# Patient Record
Sex: FEMALE | Race: WHITE | NOT HISPANIC OR LATINO | Employment: FULL TIME | URBAN - METROPOLITAN AREA
[De-identification: names, ages, dates, MRNs, and addresses within clinical notes are randomized per-mention and may not be internally consistent; named-entity substitution may affect disease eponyms.]

---

## 2020-02-11 ENCOUNTER — TELEPHONE (OUTPATIENT)
Dept: OCCUPATIONAL MEDICINE | Facility: CLINIC | Age: 60
End: 2020-02-11

## 2020-02-12 ENCOUNTER — OFFICE VISIT (OUTPATIENT)
Dept: OCCUPATIONAL MEDICINE | Facility: CLINIC | Age: 60
End: 2020-02-12

## 2020-02-12 VITALS
HEART RATE: 78 BPM | SYSTOLIC BLOOD PRESSURE: 126 MMHG | RESPIRATION RATE: 14 BRPM | BODY MASS INDEX: 28.35 KG/M2 | HEIGHT: 63 IN | OXYGEN SATURATION: 95 % | WEIGHT: 160 LBS | DIASTOLIC BLOOD PRESSURE: 88 MMHG | TEMPERATURE: 98 F

## 2020-02-12 DIAGNOSIS — Z02.1 PRE-EMPLOYMENT HEALTH SCREENING EXAMINATION: ICD-10-CM

## 2020-02-12 PROCEDURE — 8915 PR COMPREHENSIVE PHYSICAL: Performed by: NURSE PRACTITIONER

## 2020-02-12 RX ORDER — ASPIRIN 81 MG/1
81 TABLET, CHEWABLE ORAL DAILY
COMMUNITY

## 2020-02-12 SDOH — HEALTH STABILITY: MENTAL HEALTH: HOW OFTEN DO YOU HAVE A DRINK CONTAINING ALCOHOL?: NEVER

## 2020-12-07 ENCOUNTER — OFFICE VISIT (OUTPATIENT)
Dept: URGENT CARE | Facility: PHYSICIAN GROUP | Age: 60
End: 2020-12-07
Payer: COMMERCIAL

## 2020-12-07 ENCOUNTER — HOSPITAL ENCOUNTER (OUTPATIENT)
Facility: MEDICAL CENTER | Age: 60
End: 2020-12-07
Attending: PHYSICIAN ASSISTANT
Payer: COMMERCIAL

## 2020-12-07 VITALS
OXYGEN SATURATION: 99 % | TEMPERATURE: 98 F | WEIGHT: 166 LBS | RESPIRATION RATE: 16 BRPM | SYSTOLIC BLOOD PRESSURE: 132 MMHG | HEART RATE: 82 BPM | DIASTOLIC BLOOD PRESSURE: 92 MMHG | BODY MASS INDEX: 29.41 KG/M2 | HEIGHT: 63 IN

## 2020-12-07 DIAGNOSIS — N30.01 ACUTE CYSTITIS WITH HEMATURIA: ICD-10-CM

## 2020-12-07 DIAGNOSIS — R30.0 DYSURIA: ICD-10-CM

## 2020-12-07 LAB
APPEARANCE UR: NORMAL
BILIRUB UR STRIP-MCNC: NORMAL MG/DL
COLOR UR AUTO: YELLOW
GLUCOSE UR STRIP.AUTO-MCNC: NORMAL MG/DL
KETONES UR STRIP.AUTO-MCNC: NORMAL MG/DL
LEUKOCYTE ESTERASE UR QL STRIP.AUTO: NORMAL
NITRITE UR QL STRIP.AUTO: NORMAL
PH UR STRIP.AUTO: 5 [PH] (ref 5–8)
PROT UR QL STRIP: NORMAL MG/DL
RBC UR QL AUTO: NORMAL
SP GR UR STRIP.AUTO: 1.02
UROBILINOGEN UR STRIP-MCNC: 0.2 MG/DL

## 2020-12-07 PROCEDURE — 87086 URINE CULTURE/COLONY COUNT: CPT

## 2020-12-07 PROCEDURE — 81002 URINALYSIS NONAUTO W/O SCOPE: CPT | Performed by: PHYSICIAN ASSISTANT

## 2020-12-07 PROCEDURE — 99204 OFFICE O/P NEW MOD 45 MIN: CPT | Performed by: PHYSICIAN ASSISTANT

## 2020-12-07 RX ORDER — OMEPRAZOLE 20 MG/1
20 CAPSULE, DELAYED RELEASE ORAL DAILY
COMMUNITY

## 2020-12-07 RX ORDER — NITROFURANTOIN 25; 75 MG/1; MG/1
100 CAPSULE ORAL EVERY 12 HOURS
Qty: 10 CAP | Refills: 0 | Status: SHIPPED | OUTPATIENT
Start: 2020-12-07 | End: 2020-12-12

## 2020-12-07 ASSESSMENT — ENCOUNTER SYMPTOMS
FEVER: 0
FLANK PAIN: 0
CHILLS: 0
ABDOMINAL PAIN: 1

## 2020-12-10 LAB
BACTERIA UR CULT: NORMAL
SIGNIFICANT IND 70042: NORMAL
SITE SITE: NORMAL
SOURCE SOURCE: NORMAL

## 2021-03-15 DIAGNOSIS — Z23 NEED FOR VACCINATION: ICD-10-CM

## 2021-12-08 ENCOUNTER — NON-PROVIDER VISIT (OUTPATIENT)
Dept: OCCUPATIONAL MEDICINE | Facility: CLINIC | Age: 61
End: 2021-12-08

## 2021-12-08 DIAGNOSIS — Z11.1 ENCOUNTER FOR PPD TEST: ICD-10-CM

## 2021-12-08 PROCEDURE — 86580 TB INTRADERMAL TEST: CPT | Performed by: NURSE PRACTITIONER

## 2021-12-10 ENCOUNTER — NON-PROVIDER VISIT (OUTPATIENT)
Dept: OCCUPATIONAL MEDICINE | Facility: CLINIC | Age: 61
End: 2021-12-10

## 2021-12-10 LAB — TB WHEAL 3D P 5 TU DIAM: NORMAL MM

## 2023-05-16 ENCOUNTER — OFFICE VISIT (OUTPATIENT)
Dept: URGENT CARE | Facility: PHYSICIAN GROUP | Age: 63
End: 2023-05-16
Payer: COMMERCIAL

## 2023-05-16 VITALS
WEIGHT: 175 LBS | BODY MASS INDEX: 31.01 KG/M2 | RESPIRATION RATE: 18 BRPM | HEIGHT: 63 IN | SYSTOLIC BLOOD PRESSURE: 144 MMHG | OXYGEN SATURATION: 98 % | HEART RATE: 89 BPM | DIASTOLIC BLOOD PRESSURE: 88 MMHG | TEMPERATURE: 97.8 F

## 2023-05-16 DIAGNOSIS — M72.2 PLANTAR FASCIITIS OF LEFT FOOT: ICD-10-CM

## 2023-05-16 PROCEDURE — 3077F SYST BP >= 140 MM HG: CPT | Performed by: PHYSICIAN ASSISTANT

## 2023-05-16 PROCEDURE — 3079F DIAST BP 80-89 MM HG: CPT | Performed by: PHYSICIAN ASSISTANT

## 2023-05-16 PROCEDURE — 99213 OFFICE O/P EST LOW 20 MIN: CPT | Performed by: PHYSICIAN ASSISTANT

## 2023-05-16 RX ORDER — NAPROXEN 500 MG/1
500 TABLET ORAL 2 TIMES DAILY WITH MEALS
Qty: 30 TABLET | Refills: 0 | Status: SHIPPED | OUTPATIENT
Start: 2023-05-16

## 2023-05-16 RX ORDER — METHYLPREDNISOLONE 4 MG/1
4 TABLET ORAL DAILY
Qty: 21 TABLET | Refills: 0 | Status: SHIPPED | OUTPATIENT
Start: 2023-05-16

## 2023-05-16 ASSESSMENT — ENCOUNTER SYMPTOMS
NEUROLOGICAL NEGATIVE: 1
RESPIRATORY NEGATIVE: 1
CARDIOVASCULAR NEGATIVE: 1
CONSTITUTIONAL NEGATIVE: 1

## 2023-05-16 NOTE — LETTER
[facility] [place]  [address] [address]     May 16, 2023    Patient: Morena Cespedes   YOB: 1960   Date of Visit: 5/16/2023       To Whom It May Concern:    Morena Cespedes was seen and treated in our department on 5/16/2023.  Examination today does reveal plantar fasciitis of the left foot, condition is not limiting her ability to perform work, no work restrictions are needed at this    Sincerely,     Harshad Dean P.A.-C.

## 2023-05-16 NOTE — PROGRESS NOTES
"  Subjective:     Morena Cespedes  is a 62 y.o. female who presents for Foot Problem (Left foot pain x 3 weeks)       Presents today with complaint of pain over the left foot that is been ongoing for the last 3 weeks.  Symptoms are initially worsened in the morning when she first getting out of bed but do improve throughout the day.  Long periods of standing and walking at work to also worsen her symptoms.  No numbness tingling foot or toes.  No trauma or injury associated with symptom onset.         Review of Systems   Constitutional: Negative.    Respiratory: Negative.     Cardiovascular: Negative.    Musculoskeletal:         Left foot pain   Neurological: Negative.       No Known Allergies  History reviewed. No pertinent past medical history.     Objective:   BP (!) 144/88   Pulse 89   Temp 36.6 °C (97.8 °F)   Resp 18   Ht 1.6 m (5' 3\")   Wt 79.4 kg (175 lb)   SpO2 98%   BMI 31.00 kg/m²   Physical Exam  Vitals and nursing note reviewed.   Constitutional:       General: She is not in acute distress.     Appearance: She is not ill-appearing or toxic-appearing.   HENT:      Head: Normocephalic.      Nose: No rhinorrhea.   Eyes:      General: No scleral icterus.     Conjunctiva/sclera: Conjunctivae normal.   Pulmonary:      Effort: Pulmonary effort is normal. No respiratory distress.      Breath sounds: No stridor.   Musculoskeletal:      Cervical back: Neck supple.      Comments: Examination of the left foot does reveal reproducible pain along the plantar fascial surface.  Ankle and toe range of motion remain intact.   Neurological:      Mental Status: She is alert and oriented to person, place, and time.   Psychiatric:         Mood and Affect: Mood normal.         Behavior: Behavior normal.         Thought Content: Thought content normal.         Judgment: Judgment normal.             Diagnostic testing: None    Assessment/Plan:     Encounter Diagnoses   Name Primary?    Plantar fasciitis of left foot  "          Plan for care for today's complaint includes Medrol Dosepak and naproxen for left foot plantar fasciitis.  Discussed at home myofascial massage techniques and Stretching exercises for additional symptoms support.  Continue to monitor symptoms and return to urgent care or follow-up with primary care provider if symptoms remain ongoing.  Follow-up in the emergency department if symptoms become severe, ER precautions discussed in office today..  Prescription for Medrol Dosepak, naproxen provided.    See AVS Instructions below for written guidance provided to patient on after-visit management and care in addition to our verbal discussion during the visit.    Please note that this dictation was created using voice recognition software. I have attempted to correct all errors, but there may be sound-alike, spelling, grammar and possibly content errors that I did not discover before finalizing the note.    Harshad Dean PA-C

## 2023-08-18 NOTE — PROGRESS NOTES
"  Subjective:   Morena Cespedes is a 60 y.o. female who presents today with   Chief Complaint   Patient presents with   • Dysuria     pain pressure x2days        Dysuria   This is a new problem. The current episode started today. The problem occurs every urination. The problem has been unchanged. The quality of the pain is described as burning. The pain is mild. There has been no fever. There is no history of pyelonephritis. Associated symptoms include frequency and urgency. Pertinent negatives include no chills or flank pain. She has tried increased fluids for the symptoms. The treatment provided mild relief. Her past medical history is significant for kidney stones.       PMH:  has no past medical history on file.  MEDS:   Current Outpatient Medications:   •  omeprazole (PRILOSEC) 20 MG delayed-release capsule, Take 20 mg by mouth every day., Disp: , Rfl:   •  nitrofurantoin (MACROBID) 100 MG Cap, Take 1 Cap by mouth every 12 hours for 5 days., Disp: 10 Cap, Rfl: 0  •  aspirin (ASA) 81 MG Chew Tab chewable tablet, Take 81 mg by mouth every day., Disp: , Rfl:   ALLERGIES: No Known Allergies  SURGHX: History reviewed. No pertinent surgical history.  SOCHX:  reports that she has never smoked. She has never used smokeless tobacco. She reports that she does not drink alcohol or use drugs.  FH: Reviewed with patient, not pertinent to this visit.       Review of Systems   Constitutional: Negative for chills and fever.   Gastrointestinal: Positive for abdominal pain.   Genitourinary: Positive for dysuria, frequency and urgency. Negative for flank pain.   All other systems reviewed and are negative.       Objective:   /92   Pulse 82   Temp 36.7 °C (98 °F) (Temporal)   Resp 16   Ht 1.6 m (5' 3\")   Wt 75.3 kg (166 lb)   SpO2 99%   BMI 29.41 kg/m²   Physical Exam  Vitals signs and nursing note reviewed.   Constitutional:       General: She is not in acute distress.     Appearance: Normal appearance. She is " Problem: Pain  Goal: Verbalizes/displays adequate comfort level or baseline comfort level  Outcome: Progressing well-developed. She is not ill-appearing or toxic-appearing.   HENT:      Head: Normocephalic and atraumatic.      Right Ear: Hearing normal.      Left Ear: Hearing normal.   Eyes:      Conjunctiva/sclera: Conjunctivae normal.   Cardiovascular:      Rate and Rhythm: Normal rate and regular rhythm.      Heart sounds: Normal heart sounds.   Pulmonary:      Effort: Pulmonary effort is normal.      Breath sounds: Normal breath sounds.   Abdominal:      Tenderness: There is abdominal tenderness in the suprapubic area. There is no right CVA tenderness or left CVA tenderness.   Genitourinary:     Comments: Patient deferred  exam  Musculoskeletal:      Comments: Normal movement in all 4 extremities   Skin:     General: Skin is warm and dry.   Neurological:      Mental Status: She is alert.      Coordination: Coordination normal.   Psychiatric:         Mood and Affect: Mood normal.         UA consistent with UTI  Assessment/Plan:   Assessment    1. Dysuria  - POCT Urinalysis  - nitrofurantoin (MACROBID) 100 MG Cap; Take 1 Cap by mouth every 12 hours for 5 days.  Dispense: 10 Cap; Refill: 0    2. Acute cystitis with hematuria  - Urine Culture; Future    Other orders  - omeprazole (PRILOSEC) 20 MG delayed-release capsule; Take 20 mg by mouth every day.  Offered CT or ultrasound to rule out any renal stones, or other Gyn etiology but she declines and will return if symptoms do not go away.  Differential diagnosis, natural history, supportive care, and indications for immediate follow-up discussed.   Patient given instructions and understanding of medications and treatment.    If not improving in 3-5 days, F/U with PCP or return to  if symptoms worsen.  Strict ER precautions given.  Patient agreeable to plan.      Please note that this dictation was created using voice recognition software. I have made every reasonable attempt to correct obvious errors, but I expect that there are errors of grammar and possibly content that  I did not discover before finalizing the note.    Ronny Le PA-C

## 2024-08-11 ENCOUNTER — OFFICE VISIT (OUTPATIENT)
Dept: URGENT CARE | Facility: PHYSICIAN GROUP | Age: 64
End: 2024-08-11
Payer: COMMERCIAL

## 2024-08-11 VITALS
SYSTOLIC BLOOD PRESSURE: 144 MMHG | BODY MASS INDEX: 30.12 KG/M2 | HEART RATE: 63 BPM | OXYGEN SATURATION: 97 % | WEIGHT: 170 LBS | RESPIRATION RATE: 18 BRPM | TEMPERATURE: 97.4 F | HEIGHT: 63 IN | DIASTOLIC BLOOD PRESSURE: 100 MMHG

## 2024-08-11 DIAGNOSIS — R10.32 LEFT LOWER QUADRANT ABDOMINAL PAIN: ICD-10-CM

## 2024-08-11 DIAGNOSIS — R03.0 ELEVATED BLOOD PRESSURE READING: ICD-10-CM

## 2024-08-11 LAB
APPEARANCE UR: CLEAR
BILIRUB UR STRIP-MCNC: NORMAL MG/DL
COLOR UR AUTO: YELLOW
GLUCOSE UR STRIP.AUTO-MCNC: NORMAL MG/DL
KETONES UR STRIP.AUTO-MCNC: NORMAL MG/DL
LEUKOCYTE ESTERASE UR QL STRIP.AUTO: NORMAL
NITRITE UR QL STRIP.AUTO: NORMAL
PH UR STRIP.AUTO: 5.5 [PH] (ref 5–8)
PROT UR QL STRIP: 30 MG/DL
RBC UR QL AUTO: NORMAL
SP GR UR STRIP.AUTO: >=1.03
UROBILINOGEN UR STRIP-MCNC: 0.2 MG/DL

## 2024-08-11 PROCEDURE — 3077F SYST BP >= 140 MM HG: CPT | Performed by: NURSE PRACTITIONER

## 2024-08-11 PROCEDURE — 3080F DIAST BP >= 90 MM HG: CPT | Performed by: NURSE PRACTITIONER

## 2024-08-11 PROCEDURE — 99214 OFFICE O/P EST MOD 30 MIN: CPT | Performed by: NURSE PRACTITIONER

## 2024-08-11 PROCEDURE — 81002 URINALYSIS NONAUTO W/O SCOPE: CPT | Performed by: NURSE PRACTITIONER

## 2024-08-11 NOTE — LETTER
August 11, 2024         Patient: Morena Cespedes   YOB: 1960   Date of Visit: 8/11/2024           To Whom it May Concern:    Morena Cespedes was seen in my clinic on 8/11/2024. She may be excused from work yesterday, today, and tomorrow.     If you have any questions or concerns, please don't hesitate to call.        Sincerely,           BRANDON Spence.  Electronically Signed

## 2024-08-11 NOTE — PROGRESS NOTES
Chief Complaint   Patient presents with    Constipation     X 2 days constipation, hemorrhoid, Lft lower back pain, stomach pain       HISTORY OF PRESENT ILLNESS: Patient is a pleasant 64 y.o. female who presents to urgent care today with concerns of lower abdominal pain.  The patient notes that for the past 2 days she has had symptoms.  Pain is mostly to left lower quadrant, wrapping around to left low back.  Endorses associated nausea, constipation, initial tactile fever, and fatigue.  She feels that the pain is somewhat radiating to her rectum as well.  Denies previous history of the same.  Denies any urinary symptoms, she does have a history of kidney stones, states this feels different.        Patient Active Problem List    Diagnosis Date Noted    Symptoms involving skin and other integumentary tissue 02/23/2010    Abnormal weight gain 02/23/2010       Allergies:Patient has no known allergies.    Current Outpatient Medications Ordered in Epic   Medication Sig Dispense Refill    omeprazole (PRILOSEC) 20 MG delayed-release capsule Take 20 mg by mouth every day.      aspirin (ASA) 81 MG Chew Tab chewable tablet Take 81 mg by mouth every day.      naproxen (NAPROSYN) 500 MG Tab Take 1 Tablet by mouth 2 times a day with meals. (Patient not taking: Reported on 8/11/2024) 30 Tablet 0     No current Epic-ordered facility-administered medications on file.       History reviewed. No pertinent past medical history.    Social History     Tobacco Use    Smoking status: Never    Smokeless tobacco: Never   Vaping Use    Vaping status: Never Used   Substance Use Topics    Alcohol use: Never    Drug use: Never       No family status information on file.   History reviewed. No pertinent family history.    ROS:  Review of Systems   Constitutional: Positive for initial tactile fever, malaise, fatigue.    HENT: Negative for ear pain, nosebleeds, congestion, sore throat and neck pain.    Eyes: Negative for vision changes.   Neuro:  "Negative for headache, sensory changes, weakness, seizure, LOC.   Cardiovascular: Negative for chest pain, palpitations, orthopnea and leg swelling.   Respiratory: Negative for cough, sputum production, shortness of breath and wheezing.   Gastrointestinal: Positive for abdominal pain, nausea, constipation.  Genitourinary: Negative for dysuria, urgency and frequency.  Musculoskeletal: Negative for falls, neck pain, back pain, joint pain, myalgias.   Skin: Negative for rash, diaphoresis.     Exam:  BP (!) 144/100   Pulse 63   Temp 36.3 °C (97.4 °F) (Temporal)   Resp 18   Ht 1.6 m (5' 3\")   Wt 77.1 kg (170 lb)   SpO2 97%   General: well-nourished, well-developed female in NAD  Head: normocephalic, atraumatic  Eyes: PERRLA, no conjunctival injection, acuity grossly intact, lids normal.  Ears: normal shape and symmetry, no tenderness, no discharge. External canals are without any significant edema or erythema. Tympanic membranes are without any inflammation, no effusion. Gross auditory acuity is intact.  Nose: symmetrical without tenderness, no discharge.  Mouth/Throat: reasonable hygiene, no erythema, exudates or tonsillar enlargement.  Neck: no masses, range of motion within normal limits, no tracheal deviation. No obvious thyroid enlargement.   Lymph: no cervical adenopathy. No supraclavicular adenopathy.   Neuro: alert and oriented. Cranial nerves 1-12 grossly intact. No sensory deficit.   Cardiovascular: regular rate and rhythm. No edema.  Pulmonary: no distress. Chest is symmetrical with respiration, no wheezes, crackles, or rhonchi.   Abdomen: soft, left lower quadrant tenderness, no guarding, no hepatosplenomegaly.  Musculoskeletal: no clubbing, appropriate muscle tone, gait is stable.  Skin: warm, dry, intact, no clubbing, no cyanosis, no rashes.   Psych: appropriate mood, affect, judgement.         Assessment/Plan:  1. Left lower quadrant abdominal pain  POCT Urinalysis            Patient is a pleasant " 64-year-old who presents with left lower quadrant pain.  Differential diagnoses include but are not limited to: Diverticulitis, nephrolithiasis, complicated urinary tract infection.  At this time, I feel the patient requires a higher level of care in the ED for closer monitoring, stat lab work and/or imaging for further evaluation. This has been discussed with the patient and she states agreement and understanding. The patient is stable to leave POV at this time and will go directly to ED without delay, instructed remain n.p.o.         Please note that this dictation was created using voice recognition software. I have made every reasonable attempt to correct obvious errors, but I expect that there are errors of grammar and possibly content that I did not discover before finalizing the note.  As a side note, patient's blood pressure found to be elevated today, instructed to monitor and follow-up with PCP regarding.    BRANDON Spence.

## 2025-06-19 ENCOUNTER — OFFICE VISIT (OUTPATIENT)
Dept: URGENT CARE | Facility: PHYSICIAN GROUP | Age: 65
End: 2025-06-19
Payer: COMMERCIAL

## 2025-06-19 VITALS
OXYGEN SATURATION: 98 % | HEART RATE: 69 BPM | RESPIRATION RATE: 16 BRPM | BODY MASS INDEX: 28.51 KG/M2 | DIASTOLIC BLOOD PRESSURE: 92 MMHG | WEIGHT: 167 LBS | TEMPERATURE: 97.1 F | SYSTOLIC BLOOD PRESSURE: 148 MMHG | HEIGHT: 64 IN

## 2025-06-19 DIAGNOSIS — R05.1 ACUTE COUGH: ICD-10-CM

## 2025-06-19 DIAGNOSIS — J01.40 ACUTE NON-RECURRENT PANSINUSITIS: Primary | ICD-10-CM

## 2025-06-19 PROCEDURE — 3077F SYST BP >= 140 MM HG: CPT

## 2025-06-19 PROCEDURE — 99213 OFFICE O/P EST LOW 20 MIN: CPT

## 2025-06-19 PROCEDURE — 3080F DIAST BP >= 90 MM HG: CPT

## 2025-06-19 RX ORDER — BENZONATATE 100 MG/1
100 CAPSULE ORAL 3 TIMES DAILY PRN
Qty: 30 CAPSULE | Refills: 0 | Status: SHIPPED | OUTPATIENT
Start: 2025-06-19

## 2025-06-19 ASSESSMENT — ENCOUNTER SYMPTOMS
DIZZINESS: 0
EYE PAIN: 0
DOUBLE VISION: 0
ABDOMINAL PAIN: 0
SHORTNESS OF BREATH: 0
FOCAL WEAKNESS: 0
SPUTUM PRODUCTION: 0
FEVER: 0
MYALGIAS: 1
SORE THROAT: 1
WEAKNESS: 0
NECK PAIN: 0
SINUS PAIN: 1
HEADACHES: 1
WHEEZING: 0
PHOTOPHOBIA: 0
STRIDOR: 0
BLURRED VISION: 0
EYE REDNESS: 0
CHILLS: 0
COUGH: 1
DIARRHEA: 0
NAUSEA: 0
EYE DISCHARGE: 0
VOMITING: 0

## 2025-06-19 ASSESSMENT — VISUAL ACUITY: OU: 1

## 2025-06-19 NOTE — LETTER
[facility] [place]  [address] [address]     June 19, 2025    Patient: Morena Cespedes   YOB: 1960   Date of Visit: 6/19/2025       To Whom It May Concern:    Morena Cespedes was seen and treated in our department on 6/19/2025.     Sincerely,     BRANDON Velarde.

## 2025-06-19 NOTE — LETTER
[facility] URG[facility] [place]  [address] [address]     June 19, 2025    Patient: Morena Cespedes   YOB: 1960   Date of Visit: 6/19/2025       To Whom It May Concern:    Morena Cespedes was seen and treated in our department on 6/19/2025.       Please excuse Morena from work 6/13/25-6/14/25.             Sincerely,     BRANDON Velarde.

## 2025-06-19 NOTE — PROGRESS NOTES
Subjective     Morena Cespedes is a 65 y.o. female who presents with Cough (And congestion x 2 weeks, nothing is helping)    HPI:   Morena is a 66yo female presenting for cough and nasal congestion x2 weeks. Reports associated sinus pressure, looser than normal stools, and intermittent headache. Denies abdominal pain, vomiting, or diarrhea. No fever. Denies shortness of breath or increased work of breathing. Denies dysphagia or difficulty managing oral secretions.       Review of Systems   Constitutional:  Negative for chills, fever and malaise/fatigue.   HENT:  Positive for congestion, sinus pain and sore throat. Negative for ear discharge and ear pain.    Eyes:  Negative for blurred vision, double vision, photophobia, pain, discharge and redness.   Respiratory:  Positive for cough. Negative for sputum production, shortness of breath, wheezing and stridor.    Cardiovascular:  Negative for chest pain.   Gastrointestinal:  Negative for abdominal pain, diarrhea, nausea and vomiting.   Musculoskeletal:  Positive for myalgias. Negative for neck pain.   Skin:  Negative for rash.   Neurological:  Positive for headaches. Negative for dizziness, focal weakness and weakness.     History reviewed. No pertinent past medical history.     History reviewed. No pertinent surgical history.     Allergies: Latex     Current Outpatient Medications:     omeprazole (PRILOSEC) 20 MG delayed-release capsule, Take 20 mg by mouth every day., Disp: , Rfl:     aspirin (ASA) 81 MG Chew Tab chewable tablet, Take 81 mg by mouth every day., Disp: , Rfl:     Social History  Tobacco Use    Smoking status: Never    Smokeless tobacco: Never   Vaping Use    Vaping status: Never Used   Substance Use Topics    Alcohol use: Never    Drug use: Never      History reviewed. No pertinent family history.     Medications, Allergies, and current problem list reviewed today in Epic.      Objective     BP (!) 148/92 (BP Location: Left arm, Patient Position:  "Sitting, BP Cuff Size: Adult)   Pulse 69   Temp 36.2 °C (97.1 °F) (Temporal)   Resp 16   Ht 1.626 m (5' 4\")   Wt 75.8 kg (167 lb)   SpO2 98%   BMI 28.67 kg/m²      Physical Exam  Vitals reviewed.   Constitutional:       General: She is not in acute distress.  HENT:      Head: No right periorbital erythema or left periorbital erythema.      Jaw: There is normal jaw occlusion. No tenderness, swelling, pain on movement or malocclusion.      Right Ear: Tympanic membrane, ear canal and external ear normal.      Left Ear: Tympanic membrane, ear canal and external ear normal.      Nose: Congestion present.      Right Sinus: Frontal sinus tenderness present.      Left Sinus: Frontal sinus tenderness present.      Mouth/Throat:      Lips: No lesions.      Mouth: Mucous membranes are moist. No oral lesions or angioedema.      Tongue: No lesions. Tongue does not deviate from midline.      Palate: No mass and lesions.      Pharynx: Uvula midline. Posterior oropharyngeal erythema present. No oropharyngeal exudate or uvula swelling.      Tonsils: No tonsillar abscesses.   Eyes:      General: Vision grossly intact. Gaze aligned appropriately. No visual field deficit.     Extraocular Movements: Extraocular movements intact.      Conjunctiva/sclera: Conjunctivae normal.      Pupils: Pupils are equal, round, and reactive to light.      Right eye: Pupil is round, reactive and not sluggish.      Left eye: Pupil is round, reactive and not sluggish.      Funduscopic exam:     Right eye: Red reflex present.         Left eye: Red reflex present.  Neck:      Trachea: Trachea normal. No abnormal tracheal secretions or tracheal deviation.   Cardiovascular:      Rate and Rhythm: Normal rate and regular rhythm.      Pulses: Normal pulses.      Heart sounds: Normal heart sounds.   Pulmonary:      Effort: Pulmonary effort is normal. No tachypnea, accessory muscle usage, prolonged expiration, respiratory distress or retractions.      Breath " sounds: Normal breath sounds. No stridor, decreased air movement or transmitted upper airway sounds. No wheezing, rhonchi or rales.   Musculoskeletal:         General: Normal range of motion.      Cervical back: Full passive range of motion without pain, normal range of motion and neck supple. No erythema, rigidity, tenderness or crepitus. No pain with movement. Normal range of motion.   Lymphadenopathy:      Cervical: No cervical adenopathy.   Skin:     General: Skin is warm and dry.      Findings: No rash.   Neurological:      Mental Status: She is alert. Mental status is at baseline.      Sensory: Sensation is intact.      Motor: Motor function is intact.      Coordination: Coordination is intact.      Gait: Gait is intact.   Psychiatric:         Mood and Affect: Mood normal.         Behavior: Behavior normal.         Thought Content: Thought content normal.       Assessment & Plan    1. Acute non-recurrent pansinusitis (Primary)   - amoxicillin-clavulanate (AUGMENTIN) 875-125 MG Tab; Take 1 Tablet by mouth 2 times a day for 7 days.  Dispense: 14 Tablet; Refill: 0    2. Acute cough   - benzonatate (TESSALON) 100 MG Cap; Take 1 Capsule by mouth 3 times a day as needed for Cough.  Dispense: 30 Capsule; Refill: 0       MDM/Comments:   Patient meets IDSA guidelines for ABRS given duration of symptoms, worsening severity, clinical history and physical exam  Consider antihistamine, nasal steroid, anti-inflammatory, and saline rinses  No evidence of venous sinus thrombosis or more serious etiology  Typically these infections display a slow resolution of symptoms starting at 48-72 hours of treatment.  Mild pressure and pain may continue at end of week of antibiotics which is normal and continue the other supportive care until back to baseline     Recommended supportive treatment at home:  OTC Tylenol for fever/discomfort.  OTC supportive care for nasal congestion - saline nasal spray/Flonase nasal spray and/or  netipot  Humidifier and steam inhalation/warm showers.  Increase oral fluid intake.  Warm saline gargles for sore throat.     Illness progression and alarm symptoms discussed with patient, emphasizing low threshold for returning to clinic/emergency department for worsening symptoms. Patient is agreeable to the plan and verbalizes understanding, and will follow up if warranted.       Differential diagnosis, natural history, supportive care, and indications for immediate follow-up discussed.      Follow-up as needed if symptoms worsen or fail to improve to PCP, Urgent care or Emergency Room.                             Electronically signed by DEJUAN Guzman